# Patient Record
Sex: FEMALE | Race: WHITE | NOT HISPANIC OR LATINO | Employment: FULL TIME | URBAN - METROPOLITAN AREA
[De-identification: names, ages, dates, MRNs, and addresses within clinical notes are randomized per-mention and may not be internally consistent; named-entity substitution may affect disease eponyms.]

---

## 2017-05-01 ENCOUNTER — TRANSFERRED RECORDS (OUTPATIENT)
Dept: HEALTH INFORMATION MANAGEMENT | Facility: CLINIC | Age: 27
End: 2017-05-01

## 2017-05-01 LAB — PAP SMEAR - HIM PATIENT REPORTED: NEGATIVE

## 2018-05-31 ENCOUNTER — OFFICE VISIT (OUTPATIENT)
Dept: OBGYN | Facility: CLINIC | Age: 28
End: 2018-05-31
Payer: COMMERCIAL

## 2018-05-31 VITALS
SYSTOLIC BLOOD PRESSURE: 118 MMHG | HEIGHT: 64 IN | BODY MASS INDEX: 34.31 KG/M2 | DIASTOLIC BLOOD PRESSURE: 72 MMHG | WEIGHT: 201 LBS

## 2018-05-31 DIAGNOSIS — R22.32 LUMP ON FINGER, LEFT: ICD-10-CM

## 2018-05-31 DIAGNOSIS — Z30.41 ORAL CONTRACEPTIVE PILL SURVEILLANCE: ICD-10-CM

## 2018-05-31 DIAGNOSIS — Z01.419 ENCOUNTER FOR GYNECOLOGICAL EXAMINATION WITHOUT ABNORMAL FINDING: Primary | ICD-10-CM

## 2018-05-31 PROCEDURE — 99385 PREV VISIT NEW AGE 18-39: CPT | Performed by: OBSTETRICS & GYNECOLOGY

## 2018-05-31 RX ORDER — NORGESTIMATE AND ETHINYL ESTRADIOL 0.25-0.035
1 KIT ORAL DAILY
Qty: 84 TABLET | Refills: 3 | Status: SHIPPED | OUTPATIENT
Start: 2018-05-31 | End: 2019-04-22

## 2018-05-31 RX ORDER — NORGESTIMATE-ETHINYL ESTRADIOL 7DAYSX3 28
TABLET ORAL
Refills: 3 | COMMUNITY
Start: 2018-03-01 | End: 2019-04-22

## 2018-05-31 NOTE — MR AVS SNAPSHOT
After Visit Summary   5/31/2018    Svetlana Plasencia    MRN: [id number]           Patient Information     Date Of Birth          1990        Visit Information        Provider Department      5/31/2018 1:00 PM Donna Edwards MD [facility]        Today's Diagnoses     Encounter for gynecological examination without abnormal finding    -  1    Oral contraceptive pill surveillance        Lump on finger, left           Follow-ups after your visit        Additional Services     ORTHO  REFERRAL       [facility] is referring you to the Orthopedic  Services at [facility].       The  Representative will assist you in the coordination of your Orthopedic and Musculoskeletal Care as prescribed by your physician.    The  Representative will call you within 1 business day to help schedule your appointment, or you may contact the  Representative at:    All areas ~ [phone]     Type of Referral : Surgical / Specialist       Timeframe requested: Routine    Coverage of these services is subject to the terms and limitations of your health insurance plan.  Please call member services at your health plan with any benefit or coverage questions.      If X-rays, CT or MRI's have been performed, please contact the facility where they were done to arrange for , prior to your scheduled appointment.  Please bring this referral request to your appointment and present it to your specialist.                  Who to contact     If you have questions or need follow up information about today's clinic visit or your schedule please contact [facility] directly at [phone].  Normal or non-critical lab and imaging results will be communicated to you by MyChart, letter or phone within 4 business days after the clinic has received the results. If you do not hear from us within 7 days, please  "contact the clinic through [employer] or phone. If you have a critical or abnormal lab result, we will notify you by phone as soon as possible.  Submit refill requests through [employer] or call your pharmacy and they will forward the refill request to us. Please allow 3 business days for your refill to be completed.          Additional Information About Your Visit        [employer] App Information     [employer] gives you secure access to your electronic health record. If you see a primary care provider, you can also send messages to your care team and make appointments. If you have questions, please call your primary care clinic.  If you do not have a primary care provider, please call [phone] and they will assist you.        Care EveryWhere ID     This is your Care EveryWhere ID. This could be used by other organizations to access your [facility] medical records  [id number]        Your Vitals Were     Height Last Period BMI (Body Mass Index)             5' 3.5\" (1.613 m) 05/20/2018 (Exact Date) 35.05 kg/m2          Blood Pressure from Last 3 Encounters:   05/31/18 118/72    Weight from Last 3 Encounters:   05/31/18 201 lb (91.2 kg)              We Performed the Following     ORTHO  REFERRAL          Today's Medication Changes          These changes are accurate as of 5/31/18  1:46 PM.  If you have any questions, ask your nurse or doctor.               Start taking these medicines.        Dose/Directions    norgestimate-ethinyl estradiol 0.25-35 MG-MCG per tablet   Commonly known as:  ORTHO-CYCLEN, SPRINTEC   Used for:  Encounter for gynecological examination without abnormal finding, Oral contraceptive pill surveillance   Started by:  Donna Edwards MD        Dose:  1 tablet   Take 1 tablet by mouth daily   Quantity:  84 tablet   Refills:  3            Where to get your medicines      [id number] - [place], [place] - [address] [place] [address] NE AT [facility]" [address]  [address], [place] [address]     Phone:  [phone]     norgestimate-ethinyl estradiol 0.25-35 MG-MCG per tablet                Primary Care Provider    None Specified       No primary provider on file.        Equal Access to Services     PATRIZIA ORTEZ : Hadii migue russo hadalbinao Soomaali, waaxda luqadaha, qaybta kaalmada adeegyada, waxay kaylin haysonajacky adeatul matutetanyamelanie hankins. So [facility] [phone].    ATENCIÓN: Si habla español, tiene a akbar disposición servicios gratuitos de asistencia lingüística. Llame al [phone].    We comply with applicable federal civil rights laws and [place] laws. We do not discriminate on the basis of race, color, national origin, age, disability, sex, sexual orientation, or gender identity.            Thank you!     Thank you for choosing [facility]  for your care. Our goal is always to provide you with excellent care. Hearing back from our patients is one way we can continue to improve our services. Please take a few minutes to complete the written survey that you may receive in the mail after your visit with us. Thank you!             Your Updated Medication List - Protect others around you: Learn how to safely use, store and throw away your medicines at www.disposemymeds.org.          This list is accurate as of 5/31/18  1:46 PM.  Always use your most recent med list.                   Brand Name Dispense Instructions for use Diagnosis    norgestimate-ethinyl estradiol 0.25-35 MG-MCG per tablet    ORTHO-CYCLEN, SPRINTEC    84 tablet    Take 1 tablet by mouth daily    Encounter for gynecological examination without abnormal finding, Oral contraceptive pill surveillance       TRINESSA (28) 0.18/0.215/0.25 MG-35 MCG per tablet   Generic drug:  norgestim-eth estrad triphasic      TAKE 1 T PO QD

## 2018-05-31 NOTE — NURSING NOTE
"Chief Complaint   Patient presents with     Physical     disc BC, periods lasting longer on current pill. also has cyst on hand.        Initial /72  Ht 5' 3.5\" (1.613 m)  Wt 201 lb (91.2 kg)  LMP 2018 (Exact Date)  BMI 35.05 kg/m2 Estimated body mass index is 35.05 kg/(m^2) as calculated from the following:    Height as of this encounter: 5' 3.5\" (1.613 m).    Weight as of this encounter: 201 lb (91.2 kg).  BP completed using cuff size: large        The following HM Due: NONE      The following patient reported/Care Every where data was sent to:  P ABSTRACT QUALITY INITIATIVES [25025]  Pap smear done on this date: 2017 (approximately), by this group: [place], [place] were normal.       patient has appointment for today    Cora Mccoy CMA                "

## 2018-05-31 NOTE — PROGRESS NOTES
Svetlana is a 27 year old  female who presents for annual exam.     Menses are regular q 28-30 days and normal lasting 8 days.  Menses flow: normal.  Patient's last menstrual period was 2018 (exact date).. Using oral contraceptives for contraception.  She is not currently considering pregnancy.  Besides routine health maintenance,  she would like to discuss BC. Birth control hasn't changed but periods have gotten longer.   Lump on palmar surface of left index finger, bothering her and would like referral  GYNECOLOGIC HISTORY:  Menarche: 13    Svetlana is not sexually active with male partner(s) and is not currently in monogamous relationship.    History sexually transmitted infections:No STD history  STI testing offered?  Declined  OSCAR exposure: No  History of abnormal Pap smear: NO - age 21-29 PAP every 3 years recommended  Family history of breast CA: Yes (Please explain): m aunt  Family history of uterine/ovarian CA: No    Family history of colon CA: Yes (Please explain): mgfa    HEALTH MAINTENANCE:  Cholesterol: (No results found for: CHOL History of abnormal lipids: No  Mammo: no hx . History of abnormal Mammo: Not applicable.  Regular Self Breast Exams: No  Calcium/Vitamin D intake: source:  dietary supplement(s) Adequate? No  TSH: (No results found for: TSH )  Pap; (No results found for: PAP )    HISTORY:  Obstetric History       T0      L0     SAB0   TAB0   Ectopic0   Multiple0   Live Births0         No past medical history on file.  No past surgical history on file.  Family History   Problem Relation Age of Onset     Breast Cancer Maternal Aunt      Colon Cancer Paternal Grandfather      Ovarian Cancer No family hx of      Uterine Cancer No family hx of      Social History     Social History     Marital status: Single     Spouse name: N/A     Number of children: N/A     Years of education: N/A     Social History Main Topics     Smoking status: Never Smoker     Smokeless tobacco:  "Never Used     Alcohol use Yes      Comment: occ     Drug use: No     Sexual activity: Not Currently     Other Topics Concern     None     Social History Narrative     None       Current Outpatient Prescriptions:      TRINESSA, 28, 0.18/0.215/0.25 MG-35 MCG per tablet, TAKE 1 T PO QD, Disp: , Rfl: 3     Allergies   Allergen Reactions     Seasonal Allergies        Past medical, surgical, social and family history were reviewed and updated in EPIC.    ROS:   C:     NEGATIVE for fever, chills, change in weight  I:       NEGATIVE for worrisome rashes, moles or lesions  E:     NEGATIVE for vision changes or irritation  E/M: NEGATIVE for ear, mouth and throat problems  R:     NEGATIVE for significant cough or SOB  CV:   NEGATIVE for chest pain, palpitations or peripheral edema  GI:     NEGATIVE for nausea, abdominal pain, heartburn, or change in bowel habits  :   NEGATIVE for frequency, dysuria, hematuria, vaginal discharge, or irregular bleeding  M:     NEGATIVE for significant arthralgias or myalgia. Finger lump as above  N:      NEGATIVE for weakness, dizziness or paresthesias  E:      NEGATIVE for temperature intolerance, skin/hair changes  P:      NEGATIVE for changes in mood or affect.    EXAM:  Ht 5' 3.5\" (1.613 m)  Wt 201 lb (91.2 kg)  LMP 05/20/2018 (Exact Date)  BMI 35.05 kg/m2   BMI: Body mass index is 35.05 kg/(m^2).  Constitutional: healthy, alert and no distress  Head: Normocephalic. No masses, lesions, tenderness or abnormalities  Neck: Neck supple. Trachea midline. No adenopathy. Thyroid symmetric, normal size.   Cardiovascular: RRR.   Respiratory: Negative.   Breast: Breasts reveal mild symmetric fibrocystic densities, but there are no dominant, discrete, fixed or suspicious masses found.  Gastrointestinal: Abdomen soft, non-tender, non-distended. No masses, organomegaly.  :  Vulva:  No external lesions, normal female hair distribution, no inguinal adenopathy.    Urethra:  Midline, non-tender, well " supported, no discharge  Vagina:  Moist, pink, no abnormal discharge, no lesions  Uterus:  Normal size, non-tender, freely mobile  Ovaries:  No masses appreciated, non-tender, mobile  Rectal Exam: deferred  Musculoskeletal: extremities normal. 3 mm bony bump on palmar left index finger.  Skin: no suspicious lesions or rashes  Psychiatric: Affect appropriate, cooperative,mentation appears normal.     COUNSELING:   Reviewed preventive health counseling, as reflected in patient instructions       Regular exercise       Healthy diet/nutrition       Contraception   reports that she has never smoked. She has never used smokeless tobacco.    Body mass index is 35.05 kg/(m^2).  Weight management plan: Discussed healthy diet and exercise guidelines and patient will follow up in 12 months in clinic to re-evaluate.  FRAX Risk Assessment    ASSESSMENT:  27 year old female with satisfactory annual exam  (Z01.419) Encounter for gynecological examination without abnormal finding  (primary encounter diagnosis)  Comment:   Plan: norgestimate-ethinyl estradiol (ORTHO-CYCLEN,         SPRINTEC) 0.25-35 MG-MCG per tablet            (Z30.41) Oral contraceptive pill surveillance  Comment:   Plan: norgestimate-ethinyl estradiol (ORTHO-CYCLEN,         SPRINTEC) 0.25-35 MG-MCG per tablet        Try for 3 months. Will decrease estradiol if needed next.     (R22.32) Lump on finger, left  Comment:   Plan: ORTHO  REFERRAL

## 2018-06-08 ENCOUNTER — RADIANT APPOINTMENT (OUTPATIENT)
Dept: GENERAL RADIOLOGY | Facility: CLINIC | Age: 28
End: 2018-06-08
Attending: PHYSICIAN ASSISTANT
Payer: COMMERCIAL

## 2018-06-08 ENCOUNTER — OFFICE VISIT (OUTPATIENT)
Dept: ORTHOPEDICS | Facility: CLINIC | Age: 28
End: 2018-06-08
Payer: COMMERCIAL

## 2018-06-08 VITALS
BODY MASS INDEX: 34.31 KG/M2 | HEIGHT: 64 IN | DIASTOLIC BLOOD PRESSURE: 75 MMHG | SYSTOLIC BLOOD PRESSURE: 113 MMHG | RESPIRATION RATE: 16 BRPM | WEIGHT: 201 LBS

## 2018-06-08 DIAGNOSIS — M67.442 GANGLION OF FLEXOR TENDON SHEATH OF LEFT INDEX FINGER: Primary | ICD-10-CM

## 2018-06-08 DIAGNOSIS — M79.645 FINGER PAIN, LEFT: ICD-10-CM

## 2018-06-08 PROCEDURE — 99203 OFFICE O/P NEW LOW 30 MIN: CPT | Mod: 25 | Performed by: ORTHOPAEDIC SURGERY

## 2018-06-08 PROCEDURE — 20612 ASPIRATE/INJ GANGLION CYST: CPT | Mod: LT | Performed by: ORTHOPAEDIC SURGERY

## 2018-06-08 PROCEDURE — 73140 X-RAY EXAM OF FINGER(S): CPT | Mod: LT

## 2018-06-08 ASSESSMENT — PAIN SCALES - GENERAL: PAINLEVEL: MILD PAIN (2)

## 2018-06-08 NOTE — PROGRESS NOTES
Chief Complaint:   Chief Complaint   Patient presents with     Hand Pain     Left palmar sided index finger mass. Onset; 2 months. Does not limit movement but can become uncomfrotable with excessive use of fingers (typing) or with bumping against something. She is right hand dominant.         HPI: Svetlana Plasencia is a 27 year old female, right -hand dominant, who presents for evaluation and management of a left index finger mass. The mass as been noticed for 2 months. She has mild pain today, rated a 2/10.  Symptom are worsened with putting pressure on the mass, such as holding a water bottle/cup in the hand. Symptoms occur often. She reports having mild pain/discomfort around the mass site. She denies associated numbness or tingling. She denies any associated injuries or punctures to her finger in the area of the mass.  No other bumps, lumps, or other masses.        Changes in size: No   Changes in color: No   Tenderness of mass: Yes   Hot/cold sensitivity: No   Pain severity: 2/10  Pain quality: dull and aching  Frequency of symptoms: frequently.  Night pain: No   Fevers, chills, night sweats: No     Previous treatment: none    Past medical history:    There are no active problems to display for this patient.      Past surgical history:  has a past surgical history that includes no history of surgery.     Medications:    Current Outpatient Prescriptions   Medication Sig Dispense Refill     norgestimate-ethinyl estradiol (ORTHO-CYCLEN, SPRINTEC) 0.25-35 MG-MCG per tablet Take 1 tablet by mouth daily 84 tablet 3     TRINESSA, 28, 0.18/0.215/0.25 MG-35 MCG per tablet TAKE 1 T PO QD  3        Allergies:     Allergies   Allergen Reactions     Seasonal Allergies         Family History: family history includes Breast Cancer in her maternal aunt; Colon Cancer in her maternal grandfather. There is no history of Ovarian Cancer or Uterine Cancer.     Social History: .  reports that she has never smoked.  "She has never used smokeless tobacco. She reports that she drinks alcohol. She reports that she does not use illicit drugs.    Review of Systems:  ROS: 10 point ROS neg other than the symptoms noted above in the HPI and past medical history.    This document serves as a record of the services and decisions personally performed and made by Greg Shelley MD. It was created on his behalf by Cait Kessler, a trained medical scribe. The creation of this document is based the provider's statements to the medical scribe.    Scribe Cait Kessler 10:51 AM 6/8/2018       Physical Exam  GENERAL APPEARANCE: healthy, alert, no distress.   SKIN: no suspicious lesions or rashes  NEURO: Normal strength and tone, mentation intact and speech normal  PSYCH:  mentation appears normal and affect normal. Not anxious.  RESPIRATORY: No increased work of breathing.    /75  Resp 16  Ht 5' 3.5\" (1.613 m)  Wt 201 lb (91.2 kg)  LMP 05/20/2018 (Exact Date)  BMI 35.05 kg/m2     HAND EXAM:  Normal wear pattern, color and tone.  Palpable nodule, pea sized, located over ulnar aspect of volar metacarpophalangeal crease of the left index finger.  Mass does transiluminate with light.  Mass tender to palpation. Firm, fluctuant.  Negative Tinel's over mass.    No other observable or palpable masses of the fingers or palm or wrist.  No palpable triggering of fingers.   No observable or palpable cords or nodules of the fingers or palm.    There is no swelling in the finger.  There is no other tenderness in the finger.  There is no ecchymosis.  There is no erythema of the surrounding skin.  There is no maceration of the skin.  There is no deformity in the area.  Range of motion: intact  Intact sensation median, radial, ulnar nerves of the hand, and intact sensation to light touch.   Brisk capillary refill to all fingers, warm and well perfused.   Palpable radial pulse.  No palpable epitrochlear lymphadenopathy at the elbow.    X-rays:  3 views left " index finger from 6/8/2018 were reviewed in clinic today. No obvious fracture or dislocation. No obvious bony abnormality or lesion.    Assessment: 27 year old female with left index finger mass, ganglion cyst of flexor tendon sheath    Plan:  Discussed with patient that the mass is consistent with ganglion cyst, a common, benign tumor of the upper extremity. Less likely another type of tumor or neoplasm. Treatment options include: observation if asymptomatic or minimal symptoms, activity modification, splint, aspiration with cortisone injection (risks of recurrence > 50%), or surgical excision (risk of recurrence < 5-10%). Risks and benefits of each discussed in detail.    * in particular, risks of surgery include, but not limited to: bleeding, infection, pain, scar, damage to adjacent structures (nerves, vessels, bone, cartilage, tendons, ligaments), temporary versus permanent nerve injury, failure to relieve symptoms, recurrence of symptoms or recurrence of the mass, stiffness, need for further surgery, risks of anesthesia, blood clots, death    * will attempt a decompression in clinic today.  * return to clinic as needed.  * if this re-occurs, consider surgery noted above.     PROCEDURE NOTE:  The risks, benefits and potential complications (including but not limited to, bleeding, infection, pain, scar, damage to adjacent structures, atrophy or necrosis of soft tissue, skin blanching, failure to relieve symptoms) of aspiration were discussed with the patient. Questions were addressed and answered.The patient elected to proceed. Written, informed consent was obtained. The correct procedural site was identified and confirmed. A left index finger aspiration was performed using local anesthetic after sterile prep, to the correct procedural site. Mass was decompressed. Sterile bandaid applied. This was tolerated well by the patient. No apparent complications.         The information in this document, created by a  scribe for me, accurately reflects the services I personally performed and the decisions made by me. I have reviewed and approved this document for accuracy.        Greg Shelley M.D., M.S.  Dept. of Orthopaedic Surgery  [facility]

## 2018-06-08 NOTE — MR AVS SNAPSHOT
"              After Visit Summary   6/8/2018    Svetlana Plasencia    MRN: [id number]           Patient Information     Date Of Birth          1990        Visit Information        Provider Department      6/8/2018 10:30 AM Greg Shelley MD [facility]        Today's Diagnoses     Ganglion of flexor tendon sheath of left index finger    -  1    Finger pain, left           Follow-ups after your visit        Follow-up notes from your care team     Return if symptoms worsen or fail to improve.      Who to contact     If you have questions or need follow up information about today's clinic visit or your schedule please contact [facility] directly at [phone].  Normal or non-critical lab and imaging results will be communicated to you by MyChart, letter or phone within 4 business days after the clinic has received the results. If you do not hear from us within 7 days, please contact the clinic through [employer] or phone. If you have a critical or abnormal lab result, we will notify you by phone as soon as possible.  Submit refill requests through [employer] or call your pharmacy and they will forward the refill request to us. Please allow 3 business days for your refill to be completed.          Additional Information About Your Visit        MyChart Information     [employer] gives you secure access to your electronic health record. If you see a primary care provider, you can also send messages to your care team and make appointments. If you have questions, please call your primary care clinic.  If you do not have a primary care provider, please call [phone] and they will assist you.        Care EveryWhere ID     This is your Care EveryWhere ID. This could be used by other organizations to access your [place] medical records  [id number]        Your Vitals Were     Respirations Height Last Period BMI (Body Mass Index)          16 5' 3.5\" (1.613 m) 05/20/2018 (Exact Date) 35.05 kg/m2   "       Blood Pressure from Last 3 Encounters:   06/08/18 113/75   05/31/18 118/72    Weight from Last 3 Encounters:   06/08/18 201 lb (91.2 kg)   05/31/18 201 lb (91.2 kg)              We Performed the Following     ASPIRATION &/OR INJECTION GANGLION CYST, ANY        Primary Care Provider Fax #    Physician No Ref-Primary [phone]       No address on file        Equal Access to Services     PATRIZIA ORTEZ : Hadii aad ku hadasho Soomaali, waaxda luqadaha, qaybta kaalmada adeegyada, waxay idiin haysonan adeatul sarmiento laJakenaldo . So [facility] [phone].    ATENCIÓN: Si habla espmolina, tiene a akbar disposición servicios gratuitos de asistencia lingüística. Llame al [phone].    We comply with applicable federal civil rights laws and [place] laws. We do not discriminate on the basis of race, color, national origin, age, disability, sex, sexual orientation, or gender identity.            Thank you!     Thank you for choosing [facility]  for your care. Our goal is always to provide you with excellent care. Hearing back from our patients is one way we can continue to improve our services. Please take a few minutes to complete the written survey that you may receive in the mail after your visit with us. Thank you!             Your Updated Medication List - Protect others around you: Learn how to safely use, store and throw away your medicines at www.disposemymeds.org.          This list is accurate as of 6/8/18 11:59 PM.  Always use your most recent med list.                   Brand Name Dispense Instructions for use Diagnosis    norgestimate-ethinyl estradiol 0.25-35 MG-MCG per tablet    ORTHO-CYCLEN, SPRINTEC    84 tablet    Take 1 tablet by mouth daily    Encounter for gynecological examination without abnormal finding, Oral contraceptive pill surveillance       TRINESSA (28) 0.18/0.215/0.25 MG-35 MCG per tablet   Generic drug:  norgestim-eth estrad triphasic      TAKE 1 T PO QD

## 2018-06-08 NOTE — PROGRESS NOTES
The patient's left index finger was prepped with betadine solution after verification of allergies. Area approximately 10 cm x 10 cm prepped in a sterile fashion. After injection, betadine removed with soap and water and band-aids applied.    0.5cc Lidocaine 1% NDC 8497-1660-07, LOT -dk,  2019   injected into patient's superficial skin over cyst. Tendon sheath cyst was decompressed with an 18g needle by:  Alfredo Hammond PA-C, PABLO (Ortho)  Supervising Physician: Greg Shelley M.D., M.S.  Dept. of Orthopaedic Surgery  [facility]

## 2018-06-08 NOTE — LETTER
6/8/2018         RE: Svetlana Plasencia  [address]  [place] [address]        Dear Colleague,    Thank you for referring your patient, Svetlana Plasencia, to the [facility]. Please see a copy of my visit note below.    Chief Complaint:   Chief Complaint   Patient presents with     Hand Pain     Left palmar sided index finger mass. Onset; 2 months. Does not limit movement but can become uncomfrotable with excessive use of fingers (typing) or with bumping against something. She is right hand dominant.         HPI: Svetlana Plasencia is a 27 year old female, right -hand dominant, who presents for evaluation and management of a left index finger mass. The mass as been noticed for 2 months. She has mild pain today, rated a 2/10.  Symptom are worsened with putting pressure on the mass, such as holding a water bottle/cup in the hand. Symptoms occur often. She reports having mild pain/discomfort around the mass site. She denies associated numbness or tingling. She denies any associated injuries or punctures to her finger in the area of the mass.  No other bumps, lumps, or other masses.        Changes in size: No   Changes in color: No   Tenderness of mass: Yes   Hot/cold sensitivity: No   Pain severity: 2/10  Pain quality: dull and aching  Frequency of symptoms: frequently.  Night pain: No   Fevers, chills, night sweats: No     Previous treatment: none    Past medical history:    There are no active problems to display for this patient.      Past surgical history:  has a past surgical history that includes no history of surgery.     Medications:    Current Outpatient Prescriptions   Medication Sig Dispense Refill     norgestimate-ethinyl estradiol (ORTHO-CYCLEN, SPRINTEC) 0.25-35 MG-MCG per tablet Take 1 tablet by mouth daily 84 tablet 3     TRINESSA, 28, 0.18/0.215/0.25 MG-35 MCG per tablet TAKE 1 T PO QD  3        Allergies:     Allergies   Allergen Reactions     Seasonal Allergies      "    Family History: family history includes Breast Cancer in her maternal aunt; Colon Cancer in her maternal grandfather. There is no history of Ovarian Cancer or Uterine Cancer.     Social History: .  reports that she has never smoked. She has never used smokeless tobacco. She reports that she drinks alcohol. She reports that she does not use illicit drugs.    Review of Systems:  ROS: 10 point ROS neg other than the symptoms noted above in the HPI and past medical history.    This document serves as a record of the services and decisions personally performed and made by Greg Shelley MD. It was created on his behalf by Cait Kessler, a trained medical scribe. The creation of this document is based the provider's statements to the medical scribe.    Scribe Cait Kessler 10:51 AM 6/8/2018       Physical Exam  GENERAL APPEARANCE: healthy, alert, no distress.   SKIN: no suspicious lesions or rashes  NEURO: Normal strength and tone, mentation intact and speech normal  PSYCH:  mentation appears normal and affect normal. Not anxious.  RESPIRATORY: No increased work of breathing.    /75  Resp 16  Ht 5' 3.5\" (1.613 m)  Wt 201 lb (91.2 kg)  LMP 05/20/2018 (Exact Date)  BMI 35.05 kg/m2     HAND EXAM:  Normal wear pattern, color and tone.  Palpable nodule, pea sized, located over ulnar aspect of volar metacarpophalangeal crease of the left index finger.  Mass does transiluminate with light.  Mass tender to palpation. Firm, fluctuant.  Negative Tinel's over mass.    No other observable or palpable masses of the fingers or palm or wrist.  No palpable triggering of fingers.   No observable or palpable cords or nodules of the fingers or palm.    There is no swelling in the finger.  There is no other tenderness in the finger.  There is no ecchymosis.  There is no erythema of the surrounding skin.  There is no maceration of the skin.  There is no deformity in the area.  Range of motion: intact  Intact " sensation median, radial, ulnar nerves of the hand, and intact sensation to light touch.   Brisk capillary refill to all fingers, warm and well perfused.   Palpable radial pulse.  No palpable epitrochlear lymphadenopathy at the elbow.    X-rays:  3 views left index finger from 6/8/2018 were reviewed in clinic today. No obvious fracture or dislocation. No obvious bony abnormality or lesion.    Assessment: 27 year old female with left index finger mass, ganglion cyst of flexor tendon sheath    Plan:  Discussed with patient that the mass is consistent with ganglion cyst, a common, benign tumor of the upper extremity. Less likely another type of tumor or neoplasm. Treatment options include: observation if asymptomatic or minimal symptoms, activity modification, splint, aspiration with cortisone injection (risks of recurrence > 50%), or surgical excision (risk of recurrence < 5-10%). Risks and benefits of each discussed in detail.    * in particular, risks of surgery include, but not limited to: bleeding, infection, pain, scar, damage to adjacent structures (nerves, vessels, bone, cartilage, tendons, ligaments), temporary versus permanent nerve injury, failure to relieve symptoms, recurrence of symptoms or recurrence of the mass, stiffness, need for further surgery, risks of anesthesia, blood clots, death    * will attempt a decompression in clinic today.  * return to clinic as needed.  * if this re-occurs, consider surgery noted above.     PROCEDURE NOTE:  The risks, benefits and potential complications (including but not limited to, bleeding, infection, pain, scar, damage to adjacent structures, atrophy or necrosis of soft tissue, skin blanching, failure to relieve symptoms) of aspiration were discussed with the patient. Questions were addressed and answered.The patient elected to proceed. Written, informed consent was obtained. The correct procedural site was identified and confirmed. A left index finger aspiration  was performed using local anesthetic after sterile prep, to the correct procedural site. Mass was decompressed. Sterile bandaid applied. This was tolerated well by the patient. No apparent complications.         The information in this document, created by a scribe for me, accurately reflects the services I personally performed and the decisions made by me. I have reviewed and approved this document for accuracy.        Greg Shelley M.D., M.S.  Dept. of Orthopaedic Surgery  [facility]      The patient's left index finger was prepped with betadine solution after verification of allergies. Area approximately 10 cm x 10 cm prepped in a sterile fashion. After injection, betadine removed with soap and water and band-aids applied.    0.5cc Lidocaine 1% NDC 6746-2792-85, LOT -dk,  2019   injected into patient's superficial skin over cyst. Tendon sheath cyst was decompressed with an 18g needle by:  Alfredo Hammond PA-C, CAQ (Ortho)  Supervising Physician: Greg Shelley M.D., M.S.  Dept. of Orthopaedic Surgery  [facility]          Again, thank you for allowing me to participate in the care of your patient.        Sincerely,        Greg Shelley MD

## 2019-04-22 ENCOUNTER — MYC MEDICAL ADVICE (OUTPATIENT)
Dept: OBGYN | Facility: CLINIC | Age: 29
End: 2019-04-22

## 2019-04-22 DIAGNOSIS — Z30.41 ORAL CONTRACEPTIVE PILL SURVEILLANCE: ICD-10-CM

## 2019-04-22 DIAGNOSIS — Z01.419 ENCOUNTER FOR GYNECOLOGICAL EXAMINATION WITHOUT ABNORMAL FINDING: ICD-10-CM

## 2019-04-22 NOTE — TELEPHONE ENCOUNTER
Pharmacy sent refill request for birth control pills.  [employer] message sent to pt to remind her to schedule.  3 month refill sent.  Sussy Knowles RN

## 2019-05-07 RX ORDER — NORGESTIMATE AND ETHINYL ESTRADIOL 0.25-0.035
1 KIT ORAL DAILY
Qty: 84 TABLET | Refills: 0 | Status: SHIPPED | OUTPATIENT
Start: 2019-05-07 | End: 2019-06-06

## 2019-05-07 NOTE — TELEPHONE ENCOUNTER
Pending Prescriptions:                       Disp   Refills    norgestimate-ethinyl estradiol (ORTHO-CYC*84 tab*0            Sig: Take 1 tablet by mouth daily    Appt made. Rx sent.  Ramona Armenta

## 2019-06-06 ENCOUNTER — OFFICE VISIT (OUTPATIENT)
Dept: OBGYN | Facility: CLINIC | Age: 29
End: 2019-06-06
Payer: COMMERCIAL

## 2019-06-06 VITALS
HEIGHT: 64 IN | OXYGEN SATURATION: 100 % | HEART RATE: 75 BPM | SYSTOLIC BLOOD PRESSURE: 119 MMHG | DIASTOLIC BLOOD PRESSURE: 87 MMHG | TEMPERATURE: 97.7 F | BODY MASS INDEX: 32.61 KG/M2 | WEIGHT: 191 LBS

## 2019-06-06 DIAGNOSIS — Z30.41 ORAL CONTRACEPTIVE PILL SURVEILLANCE: ICD-10-CM

## 2019-06-06 DIAGNOSIS — Z01.419 ENCOUNTER FOR GYNECOLOGICAL EXAMINATION WITHOUT ABNORMAL FINDING: ICD-10-CM

## 2019-06-06 PROCEDURE — 99395 PREV VISIT EST AGE 18-39: CPT | Performed by: OBSTETRICS & GYNECOLOGY

## 2019-06-06 RX ORDER — NORGESTIMATE AND ETHINYL ESTRADIOL 0.25-0.035
1 KIT ORAL DAILY
Qty: 84 TABLET | Refills: 3 | Status: SHIPPED | OUTPATIENT
Start: 2019-06-06 | End: 2020-05-05

## 2019-06-06 RX ORDER — NORGESTIMATE AND ETHINYL ESTRADIOL 0.25-0.035
1 KIT ORAL DAILY
Qty: 84 TABLET | Refills: 3 | Status: SHIPPED | OUTPATIENT
Start: 2019-06-06 | End: 2019-06-06

## 2019-06-06 ASSESSMENT — ANXIETY QUESTIONNAIRES
5. BEING SO RESTLESS THAT IT IS HARD TO SIT STILL: NOT AT ALL
1. FEELING NERVOUS, ANXIOUS, OR ON EDGE: SEVERAL DAYS
7. FEELING AFRAID AS IF SOMETHING AWFUL MIGHT HAPPEN: NOT AT ALL
GAD7 TOTAL SCORE: 5
IF YOU CHECKED OFF ANY PROBLEMS ON THIS QUESTIONNAIRE, HOW DIFFICULT HAVE THESE PROBLEMS MADE IT FOR YOU TO DO YOUR WORK, TAKE CARE OF THINGS AT HOME, OR GET ALONG WITH OTHER PEOPLE: SOMEWHAT DIFFICULT
2. NOT BEING ABLE TO STOP OR CONTROL WORRYING: SEVERAL DAYS
6. BECOMING EASILY ANNOYED OR IRRITABLE: SEVERAL DAYS
3. WORRYING TOO MUCH ABOUT DIFFERENT THINGS: SEVERAL DAYS

## 2019-06-06 ASSESSMENT — MIFFLIN-ST. JEOR: SCORE: 1573.43

## 2019-06-06 ASSESSMENT — PATIENT HEALTH QUESTIONNAIRE - PHQ9
SUM OF ALL RESPONSES TO PHQ QUESTIONS 1-9: 4
5. POOR APPETITE OR OVEREATING: SEVERAL DAYS

## 2019-06-06 NOTE — NURSING NOTE
"Chief Complaint   Patient presents with     Contraception     Switching bc rx.        Initial /87   Pulse 75   Temp 97.7  F (36.5  C) (Oral)   Ht 1.613 m (5' 3.5\")   Wt 86.6 kg (191 lb)   LMP 2019 (Approximate)   SpO2 100%   Breastfeeding? No   BMI 33.30 kg/m   Estimated body mass index is 33.3 kg/m  as calculated from the following:    Height as of this encounter: 1.613 m (5' 3.5\").    Weight as of this encounter: 86.6 kg (191 lb).  BP completed using cuff size: large    Questioned patient about current smoking habits.  Pt. has never smoked.          The following HM Due: NONE  Vaccinations: tdap      The following patient reported/Care Every where data was sent to:  P ABSTRACT QUALITY INITIATIVES [[id number]]  n/a      patient has appointment for today              "

## 2019-06-06 NOTE — PROGRESS NOTES
Svetlana is a 28 year old  female who presents for annual exam.     Menses are regular q 28-30 days and normal lasting 5 days.  Menses flow: normal.  Patient's last menstrual period was 2019. Using oral contraceptives for contraception.  She is not currently considering pregnancy.  Besides routine health maintenance, she has no other health concerns today . Periods were heavier, but now normalizing.   GYNECOLOGIC HISTORY:  Menarche: 13     Svetlana is not sexually active with male partner(s) and is not currently in monogamous relationship.    History sexually transmitted infections:No STD history  STI testing offered?  Declined  OSCAR exposure: No  History of abnormal Pap smear: NO - age 21-29 PAP every 3 years recommended  Family history of breast CA: Yes (Please explain): m aunt  Family history of uterine/ovarian CA: No     Family history of colon CA: Yes (Please explain): mgfa     HEALTH MAINTENANCE:  Cholesterol: (No results found for: CHOL History of abnormal lipids: No  Mammo: no hx . History of abnormal Mammo: Not applicable.  Regular Self Breast Exams: No  Calcium/Vitamin D intake: source:  dietary supplement(s) Adequate? No  TSH: (No results found for: TSH )  Pap; (No results found for: PAP )     HISTORY:  OB History    Para Term  AB Living   0 0 0 0 0 0   SAB TAB Ectopic Multiple Live Births   0 0 0 0 0     Past Medical History:   Diagnosis Date     NO ACTIVE PROBLEMS      Past Surgical History:   Procedure Laterality Date     NO HISTORY OF SURGERY       Family History   Problem Relation Age of Onset     Breast Cancer Maternal Aunt      Colon Cancer Maternal Grandfather      Ovarian Cancer No family hx of      Uterine Cancer No family hx of      Social History     Socioeconomic History     Marital status: Single     Spouse name: Not on file     Number of children: Not on file     Years of education: Not on file     Highest education level: Not on file   Occupational History     Not  on file   Social Needs     Financial resource strain: Not on file     Food insecurity:     Worry: Not on file     Inability: Not on file     Transportation needs:     Medical: Not on file     Non-medical: Not on file   Tobacco Use     Smoking status: Never Smoker     Smokeless tobacco: Never Used   Substance and Sexual Activity     Alcohol use: Yes     Comment: occ     Drug use: No     Sexual activity: Not Currently   Lifestyle     Physical activity:     Days per week: Not on file     Minutes per session: Not on file     Stress: Not on file   Relationships     Social connections:     Talks on phone: Not on file     Gets together: Not on file     Attends Congregational service: Not on file     Active member of club or organization: Not on file     Attends meetings of clubs or organizations: Not on file     Relationship status: Not on file     Intimate partner violence:     Fear of current or ex partner: Not on file     Emotionally abused: Not on file     Physically abused: Not on file     Forced sexual activity: Not on file   Other Topics Concern     Not on file   Social History Narrative     Not on file       Current Outpatient Medications:      norgestimate-ethinyl estradiol (ORTHO-CYCLEN/SPRINTEC) 0.25-35 MG-MCG tablet, Take 1 tablet by mouth daily, Disp: 84 tablet, Rfl: 3     Allergies   Allergen Reactions     Seasonal Allergies        Past medical, surgical, social and family history were reviewed and updated in EPIC.    ROS:   C:     NEGATIVE for fever, chills, change in weight  I:       NEGATIVE for worrisome rashes, moles or lesions  E:     NEGATIVE for vision changes or irritation  E/M: NEGATIVE for ear, mouth and throat problems  R:     NEGATIVE for significant cough or SOB  CV:   NEGATIVE for chest pain, palpitations or peripheral edema  GI:     NEGATIVE for nausea, abdominal pain, heartburn, or change in bowel habits  :   NEGATIVE for frequency, dysuria, hematuria, vaginal discharge, or irregular  "bleeding  M:     NEGATIVE for significant arthralgias or myalgia  N:      NEGATIVE for weakness, dizziness or paresthesias  E:      NEGATIVE for temperature intolerance, skin/hair changes  P:      NEGATIVE for changes in mood or affect.    EXAM:  /87   Pulse 75   Temp 97.7  F (36.5  C) (Oral)   Ht 1.613 m (5' 3.5\")   Wt 86.6 kg (191 lb)   LMP 05/22/2019 (Approximate)   SpO2 100%   Breastfeeding? No   BMI 33.30 kg/m     BMI: Body mass index is 33.3 kg/m .  Constitutional: healthy, alert and no distress  Head: Normocephalic. No masses, lesions, tenderness or abnormalities  Neck: Neck supple. Trachea midline. No adenopathy. Thyroid symmetric, normal size.   Cardiovascular: RRR.   Respiratory: Negative.   Breast: Breasts reveal mild symmetric fibrocystic densities, but there are no dominant, discrete, fixed or suspicious masses found.  Gastrointestinal: Abdomen soft, non-tender, non-distended. No masses, organomegaly.  :  Vulva:  No external lesions, normal female hair distribution, no inguinal adenopathy.    Urethra:  Midline, non-tender, well supported, no discharge  Vagina:  Moist, pink, no abnormal discharge, no lesions  Uterus:  Normal size, anteverted , non-tender, freely mobile  Ovaries:  No masses appreciated, non-tender, mobile  Rectal Exam: deferred  Musculoskeletal: extremities normal  Skin: no suspicious lesions or rashes  Psychiatric: Affect appropriate, cooperative,mentation appears normal.     COUNSELING:   Reviewed preventive health counseling, as reflected in patient instructions       Regular exercise       Healthy diet/nutrition       Sleep hygiene   reports that she has never smoked. She has never used smokeless tobacco.    Body mass index is 33.3 kg/m .  Weight management plan: Discussed healthy diet and exercise guidelines. Down 10 pounds from last year.  FRAX Risk Assessment    ASSESSMENT:  28 year old female with satisfactory annual exam  (Z01.419) Encounter for gynecological " examination without abnormal finding  Comment:   Plan: norgestimate-ethinyl estradiol         (ORTHO-CYCLEN/SPRINTEC) 0.25-35 MG-MCG tablet,         DISCONTINUED: norgestimate-ethinyl estradiol         (ORTHO-CYCLEN/SPRINTEC) 0.25-35 MG-MCG tablet   May want to switch pills, just send [employer] message if so.    (Z30.41) Oral contraceptive pill surveillance  Comment:   Plan: norgestimate-ethinyl estradiol         (ORTHO-CYCLEN/SPRINTEC) 0.25-35 MG-MCG tablet,         DISCONTINUED: norgestimate-ethinyl estradiol         (ORTHO-CYCLEN/SPRINTEC) 0.25-35 MG-MCG tablet

## 2019-06-07 ASSESSMENT — ANXIETY QUESTIONNAIRES: GAD7 TOTAL SCORE: 5

## 2019-07-31 ENCOUNTER — TELEPHONE (OUTPATIENT)
Dept: FAMILY MEDICINE | Facility: CLINIC | Age: 29
End: 2019-07-31

## 2019-07-31 NOTE — PROGRESS NOTES
Svetlana Plasencia is a 28 year old female who presents to clinic today for the following health issues:    Dog Bite - Finger       Duration: Tuesday evening     Description (location/character/radiation): A puppy that she has and was holding a toy out for her and puppy bit her pointy finger by accident. Puppy has an infection and vets said she might have septic arthritis like a bacterial infection of joints but they are not sure. Puppy is on anti-biotics. Pointy finger has no constant pain but if touch it will be a little sore and tender. Swelling seems to have gone down.      Intensity:  mild    Accompanying signs and symptoms: tenderness, soreness when touching it/     History (similar episodes/previous evaluation): None    Precipitating or alleviating factors: None    Therapies tried and outcome: antio-biotic cream and bandage switching,      -Patient would like to make sure she does not have an infection related to the dog bite, dog is currently on anti-biotics  -Finger has been throbbing, she notes the swelling has gone down and the wound seems to be improving, pain has improved  -She has used an unspecified antibiotic ointment       Problem list and histories reviewed & adjusted, as indicated.  Additional history: as documented    ROS:  CONSTITUTIONAL: NEGATIVE for fever, chills, change in weight  INTEGUMENTARY/SKIN: NEGATIVE for worrisome rashes, moles or lesions  EYES: NEGATIVE for vision changes or irritation  ENT/MOUTH: NEGATIVE for ear, mouth and throat problems  RESP: NEGATIVE for significant cough or SOB  BREAST: NEGATIVE for masses, tenderness or discharge  CV: NEGATIVE for chest pain, palpitations or peripheral edema  GI: NEGATIVE for nausea, abdominal pain, heartburn, or change in bowel habits  : NEGATIVE for frequency, dysuria, or hematuria  MUSCULOSKELETAL: NEGATIVE for significant arthralgias or myalgia, see HPI above  NEURO: NEGATIVE for weakness, dizziness or paresthesias  ENDOCRINE: NEGATIVE  "for temperature intolerance, skin/hair changes  HEME: NEGATIVE for bleeding problems  PSYCHIATRIC: NEGATIVE for changes in mood or affect    This document serves as a record of the services and decisions personally performed and made by Keiko Abraham PA-C. It was created on her behalf by Charlie Victor, trained medical scribe. The creation of this document is based on the provider's statements to the medical scribe.  Charlie Victor 7:30 AM August 1, 2019    There is no problem list on file for this patient.    Past Surgical History:   Procedure Laterality Date     NO HISTORY OF SURGERY         Social History     Tobacco Use     Smoking status: Never Smoker     Smokeless tobacco: Never Used   Substance Use Topics     Alcohol use: Yes     Comment: occ.     Family History   Problem Relation Age of Onset     Breast Cancer Maternal Aunt      Colon Cancer Maternal Grandfather      Ovarian Cancer No family hx of      Uterine Cancer No family hx of            Labs reviewed in EPIC  There is no problem list on file for this patient.    Past Surgical History:   Procedure Laterality Date     NO HISTORY OF SURGERY         Social History     Tobacco Use     Smoking status: Never Smoker     Smokeless tobacco: Never Used   Substance Use Topics     Alcohol use: Yes     Comment: occ.     Family History   Problem Relation Age of Onset     Breast Cancer Maternal Aunt      Colon Cancer Maternal Grandfather      Ovarian Cancer No family hx of      Uterine Cancer No family hx of          Current Outpatient Medications   Medication Sig Dispense Refill     cephALEXin (KEFLEX) 500 MG capsule Take 1 capsule (500 mg) by mouth 4 times daily for 7 days 28 capsule 0     norgestimate-ethinyl estradiol (ORTHO-CYCLEN/SPRINTEC) 0.25-35 MG-MCG tablet Take 1 tablet by mouth daily 84 tablet 3       OBJECTIVE:                                                    /78   Pulse 80   Temp 99.2  F (37.3  C) (Oral)   Ht 1.6 m (5' 3\")   Wt 85.2 kg " (187 lb 12.8 oz)   LMP 07/15/2019 (Approximate)   SpO2 97%   BMI 33.27 kg/m   Body mass index is 33.27 kg/m .   GENERAL:: healthy, alert and no distress  MS: extremities- no gross deformities noted, right second digit's distal tip is red and swollen, 3 mm ulceration, mildly decreased flexion at the DIP joint. Negative for lymphangitis.   SKIN: no suspicious lesions, no rashes  NEURO: strength and tone- normal, sensory exam- grossly normal, mentation- intact, speech- normal  PSYCH: Alert and oriented times 3; speech- coherent , normal rate and volume; able to articulate logical thoughts, able to abstract reason, no tangential thoughts, no hallucinations or delusions, affect- normal     No results found for this or any previous visit (from the past 24 hour(s)).       ASSESSMENT/PLAN:                                                        ICD-10-CM    1. Cellulitis of finger of right hand L03.011 cephALEXin (KEFLEX) 500 MG capsule     Continue with topical Neosporin. If any worsening over the weekend, start oral antibiotics. Return to clinic for any new or worsening symptoms or go to ER Urgent care in off hours       Patient Instructions     Patient Education     Cellulitis  Cellulitis is an infection of the deep layers of skin. A break in the skin, such as a cut or scratch, can let bacteria under the skin. If the bacteria get to deep layers of the skin, it can be serious. If not treated, cellulitis can get into the bloodstream and lymph nodes. The infection can then spread throughout the body. This causes serious illness.  Cellulitis causes the affected skin to become red, swollen, warm, and sore. The reddened areas have a visible border. An open sore may leak fluid (pus). You may have a fever, chills, and pain.  Cellulitis is treated with antibiotics taken for 7 to 10 days. An open sore may be cleaned and covered with cool wet gauze. Symptoms should get better 1 to 2 days after treatment is started. Make sure to  "take all the antibiotics for the full number of days until they are gone. Keep taking the medicine even if your symptoms go away.  Home care  Follow these tips:    Limit the use of the part of your body with cellulitis.     If the infection is on your leg, keep your leg raised while sitting. This will help to reduce swelling.    Take all of the antibiotic medicine exactly as directed until it is gone. Do not miss any doses, especially during the first 7 days. Don t stop taking the medicine when your symptoms get better.    Keep the affected area clean and dry.    Wash your hands with soap and warm water before and after touching your skin. Anyone else who touches your skin should also wash his or her hands. Don't share towels.  Follow-up care  Follow up with your healthcare provider, or as advised. If your infection does not go away on the first antibiotic, your healthcare provider will prescribe a different one.  When to seek medical advice  Call your healthcare provider right away if any of these occur:    Red areas that spread    Swelling or pain that gets worse    Fluid leaking from the skin (pus)    Fever higher of 100.4  F (38.0  C) or higher after 2 days on antibiotics  Date Last Reviewed: 9/1/2016 2000-2018 [employer]. [address] [place] [address]. All rights reserved. This information is not intended as a substitute for professional medical care. Always follow your healthcare professional's instructions.               Estimated body mass index is 33.27 kg/m  as calculated from the following:    Height as of this encounter: 1.6 m (5' 3\").    Weight as of this encounter: 85.2 kg (187 lb 12.8 oz).     The information in this document, created by the medical scribe for me, accurately reflects the services I personally performed and the decisions made by me. I have reviewed and approved this document for accuracy prior to leaving the patient care area.  August 1, 2019 7:30 " AM    Keiko Abraham  [facility] [place]

## 2019-07-31 NOTE — TELEPHONE ENCOUNTER
Received call from patient stated that she has a dog bite. Location of bite-- right behind nail bed, on hand, pointer finger. It is swollen, red, and painful.    --Vet is not sure what the dog has, but her elbow joint was super swollen so they diagnosed her with bacterial infection.     Depth of bite-- she can't tell. It also ripped some skin off. Dog is a puppy, so not super that it is too deep. She put antibiotic on it.     Writer advised to keep area clean, keep antibiotic ointment on it, and keep it covered, and then if not improving, come into clinic to be seen. Pt scheduled OV for tomorrow with provider to get finger checked if not improving.    Sarai Awan RN  [facility]

## 2019-08-01 ENCOUNTER — OFFICE VISIT (OUTPATIENT)
Dept: FAMILY MEDICINE | Facility: CLINIC | Age: 29
End: 2019-08-01
Payer: COMMERCIAL

## 2019-08-01 VITALS
BODY MASS INDEX: 33.27 KG/M2 | TEMPERATURE: 99.2 F | DIASTOLIC BLOOD PRESSURE: 78 MMHG | WEIGHT: 187.8 LBS | HEART RATE: 80 BPM | SYSTOLIC BLOOD PRESSURE: 112 MMHG | OXYGEN SATURATION: 97 % | HEIGHT: 63 IN

## 2019-08-01 DIAGNOSIS — L03.011 CELLULITIS OF FINGER OF RIGHT HAND: Primary | ICD-10-CM

## 2019-08-01 PROCEDURE — 99203 OFFICE O/P NEW LOW 30 MIN: CPT | Performed by: PHYSICIAN ASSISTANT

## 2019-08-01 RX ORDER — CEPHALEXIN 500 MG/1
500 CAPSULE ORAL 4 TIMES DAILY
Qty: 28 CAPSULE | Refills: 0 | Status: SHIPPED | OUTPATIENT
Start: 2019-08-01 | End: 2019-08-08

## 2019-08-01 ASSESSMENT — MIFFLIN-ST. JEOR: SCORE: 1550.99

## 2019-08-01 NOTE — PATIENT INSTRUCTIONS
Patient Education     Cellulitis  Cellulitis is an infection of the deep layers of skin. A break in the skin, such as a cut or scratch, can let bacteria under the skin. If the bacteria get to deep layers of the skin, it can be serious. If not treated, cellulitis can get into the bloodstream and lymph nodes. The infection can then spread throughout the body. This causes serious illness.  Cellulitis causes the affected skin to become red, swollen, warm, and sore. The reddened areas have a visible border. An open sore may leak fluid (pus). You may have a fever, chills, and pain.  Cellulitis is treated with antibiotics taken for 7 to 10 days. An open sore may be cleaned and covered with cool wet gauze. Symptoms should get better 1 to 2 days after treatment is started. Make sure to take all the antibiotics for the full number of days until they are gone. Keep taking the medicine even if your symptoms go away.  Home care  Follow these tips:    Limit the use of the part of your body with cellulitis.     If the infection is on your leg, keep your leg raised while sitting. This will help to reduce swelling.    Take all of the antibiotic medicine exactly as directed until it is gone. Do not miss any doses, especially during the first 7 days. Don t stop taking the medicine when your symptoms get better.    Keep the affected area clean and dry.    Wash your hands with soap and warm water before and after touching your skin. Anyone else who touches your skin should also wash his or her hands. Don't share towels.  Follow-up care  Follow up with your healthcare provider, or as advised. If your infection does not go away on the first antibiotic, your healthcare provider will prescribe a different one.  When to seek medical advice  Call your healthcare provider right away if any of these occur:    Red areas that spread    Swelling or pain that gets worse    Fluid leaking from the skin (pus)    Fever higher of 100.4  F (38.0  C) or  higher after 2 days on antibiotics  Date Last Reviewed: 9/1/2016 2000-2018 [employer]. [address] [place] [address], [place] [address]. All rights reserved. This information is not intended as a substitute for professional medical care. Always follow your healthcare professional's instructions.

## 2020-03-11 ENCOUNTER — HEALTH MAINTENANCE LETTER (OUTPATIENT)
Age: 30
End: 2020-03-11

## 2020-05-05 ENCOUNTER — MYC REFILL (OUTPATIENT)
Dept: OBGYN | Facility: CLINIC | Age: 30
End: 2020-05-05

## 2020-05-05 DIAGNOSIS — Z30.41 ORAL CONTRACEPTIVE PILL SURVEILLANCE: ICD-10-CM

## 2020-05-05 DIAGNOSIS — Z01.419 ENCOUNTER FOR GYNECOLOGICAL EXAMINATION WITHOUT ABNORMAL FINDING: ICD-10-CM

## 2020-05-05 RX ORDER — NORGESTIMATE AND ETHINYL ESTRADIOL 0.25-0.035
1 KIT ORAL DAILY
Qty: 84 TABLET | Refills: 0 | Status: SHIPPED | OUTPATIENT
Start: 2020-05-05 | End: 2020-07-21

## 2020-05-05 NOTE — TELEPHONE ENCOUNTER
Refill request sent for OCP's. Patient's last annual exam 06/2020. Due to COVID-19 and need to back preventative appointments, refill sent. Instruction attached to have patient call in July to schedule an appointment. Doris Oconnell RN

## 2020-07-20 DIAGNOSIS — Z01.419 ENCOUNTER FOR GYNECOLOGICAL EXAMINATION WITHOUT ABNORMAL FINDING: ICD-10-CM

## 2020-07-20 DIAGNOSIS — Z30.41 ORAL CONTRACEPTIVE PILL SURVEILLANCE: ICD-10-CM

## 2020-07-21 RX ORDER — NORGESTIMATE AND ETHINYL ESTRADIOL 0.25-0.035
1 KIT ORAL DAILY
Qty: 84 TABLET | Refills: 3 | Status: SHIPPED | OUTPATIENT
Start: 2020-07-21 | End: 2021-02-16

## 2020-07-21 NOTE — TELEPHONE ENCOUNTER
Routing refill request to provider for review/approval because:  Kaci given x1 and patient did not follow up, please advise

## 2021-01-03 ENCOUNTER — HEALTH MAINTENANCE LETTER (OUTPATIENT)
Age: 31
End: 2021-01-03

## 2021-02-16 ENCOUNTER — OFFICE VISIT (OUTPATIENT)
Dept: OBGYN | Facility: CLINIC | Age: 31
End: 2021-02-16
Payer: COMMERCIAL

## 2021-02-16 VITALS
SYSTOLIC BLOOD PRESSURE: 116 MMHG | HEART RATE: 76 BPM | BODY MASS INDEX: 35.29 KG/M2 | TEMPERATURE: 97.8 F | WEIGHT: 199.2 LBS | DIASTOLIC BLOOD PRESSURE: 77 MMHG

## 2021-02-16 DIAGNOSIS — Z01.419 ENCOUNTER FOR GYNECOLOGICAL EXAMINATION WITHOUT ABNORMAL FINDING: Primary | ICD-10-CM

## 2021-02-16 DIAGNOSIS — F43.25 ADJUSTMENT DISORDER WITH MIXED DISTURBANCE OF EMOTIONS AND CONDUCT: ICD-10-CM

## 2021-02-16 DIAGNOSIS — E66.09 CLASS 2 OBESITY DUE TO EXCESS CALORIES WITHOUT SERIOUS COMORBIDITY WITH BODY MASS INDEX (BMI) OF 35.0 TO 35.9 IN ADULT: ICD-10-CM

## 2021-02-16 DIAGNOSIS — E66.812 CLASS 2 OBESITY DUE TO EXCESS CALORIES WITHOUT SERIOUS COMORBIDITY WITH BODY MASS INDEX (BMI) OF 35.0 TO 35.9 IN ADULT: ICD-10-CM

## 2021-02-16 DIAGNOSIS — Z12.4 SCREENING FOR MALIGNANT NEOPLASM OF CERVIX: ICD-10-CM

## 2021-02-16 PROCEDURE — G0145 SCR C/V CYTO,THINLAYER,RESCR: HCPCS | Performed by: OBSTETRICS & GYNECOLOGY

## 2021-02-16 PROCEDURE — 87624 HPV HI-RISK TYP POOLED RSLT: CPT | Performed by: OBSTETRICS & GYNECOLOGY

## 2021-02-16 PROCEDURE — 99395 PREV VISIT EST AGE 18-39: CPT | Performed by: OBSTETRICS & GYNECOLOGY

## 2021-02-16 NOTE — PROGRESS NOTES
Svetlana is a 30 year old  female who presents for annual exam.     Menses are regular q 28-30 days and normal lasting 4 days.  Menses flow: normal and light.  Patient's last menstrual period was 2021.. Using oral contraceptives for contraception.  She is not currently considering pregnancy.  Besides routine health maintenance, she has no other health concerns today . COVID pandemic has been boring and lonely for her, but she feels she's doing ok. Work is stressful. Gets anxious when thinking about going back to work.   Heart rates goes up when she's stressed.   Would like to stop OCPs since not sexually active.         PHQ-2 Score:     PHQ-2 (  [employer]) 2021 2/15/2021   Q1: Little interest or pleasure in doing things 0 1   Q2: Feeling down, depressed or hopeless 1 1   PHQ-2 Score 1 2   Q1: Little interest or pleasure in doing things - Several days   Q2: Feeling down, depressed or hopeless - Several days   PHQ-2 Score - 2       GYNECOLOGIC HISTORY:  Menarche: 13  Age at first intercourse: 20  Svetlana is not sexually active with 0 male partner(s) and is not currently in monogamous relationship.    History sexually transmitted infections:No STD history  STI testing offered?  Declined  OSCAR exposure: No  History of abnormal Pap smear: NO - age 30- 65 PAP every 3 years recommended  Family history of breast CA: Yes (Please explain): maaunt  Family history of uterine/ovarian CA: No    Family history of colon CA: Yes (Please explain): mgpa    HEALTH MAINTENANCE:  Cholesterol: (No results found for: CHOL History of abnormal lipids: No  Mammo: na . History of abnormal Mammo: Not applicable.  Regular Self Breast Exams: No  Calcium/Vitamin D intake: source:  dietary supplement(s) Adequate? No  TSH: (No results found for: TSH )  Pap; (No results found for: PAP )    HISTORY:  OB History    Para Term  AB Living   0 0 0 0 0 0   SAB TAB Ectopic Multiple Live Births   0 0 0 0 0     Past Medical  History:   Diagnosis Date     NO ACTIVE PROBLEMS      Past Surgical History:   Procedure Laterality Date     NO HISTORY OF SURGERY       Family History   Problem Relation Age of Onset     Breast Cancer Maternal Aunt      Colon Cancer Maternal Grandfather      Cancer Paternal Grandfather         unknown     Ovarian Cancer No family hx of      Uterine Cancer No family hx of      Social History     Socioeconomic History     Marital status: Single     Spouse name: None     Number of children: None     Years of education: None     Highest education level: None   Occupational History     None   Social Needs     Financial resource strain: None     Food insecurity     Worry: None     Inability: None     Transportation needs     Medical: None     Non-medical: None   Tobacco Use     Smoking status: Never Smoker     Smokeless tobacco: Never Used   Substance and Sexual Activity     Alcohol use: Yes     Comment: occ.     Drug use: No     Sexual activity: Not Currently     Birth control/protection: Pill   Lifestyle     Physical activity     Days per week: None     Minutes per session: None     Stress: None   Relationships     Social connections     Talks on phone: None     Gets together: None     Attends Religion service: None     Active member of club or organization: None     Attends meetings of clubs or organizations: None     Relationship status: None     Intimate partner violence     Fear of current or ex partner: None     Emotionally abused: None     Physically abused: None     Forced sexual activity: None   Other Topics Concern     Parent/sibling w/ CABG, MI or angioplasty before 65F 55M? Not Asked   Social History Narrative     None     No current outpatient medications on file.     Allergies   Allergen Reactions     Seasonal Allergies        Past medical, surgical, social and family history were reviewed and updated in EPIC.    ROS:   C:     NEGATIVE for fever, chills, change in weight  I:       NEGATIVE for worrisome  rashes, moles or lesions  E:     NEGATIVE for vision changes or irritation  E/M: NEGATIVE for ear, mouth and throat problems  R:     NEGATIVE for significant cough or SOB  CV:   NEGATIVE for chest pain, palpitations or peripheral edema  GI:     NEGATIVE for nausea, abdominal pain, heartburn, or change in bowel habits  :   NEGATIVE for frequency, dysuria, hematuria, vaginal discharge, or irregular bleeding  M:     NEGATIVE for significant arthralgias or myalgia  N:      NEGATIVE for weakness, dizziness or paresthesias  E:      NEGATIVE for temperature intolerance, skin/hair changes  P:      NEGATIVE for changes in mood or affect.    EXAM:  BP (!) 137/93   Pulse 78   Temp 97.8  F (36.6  C) (Oral)   Wt 90.4 kg (199 lb 3.2 oz)   LMP 01/25/2021   Breastfeeding No   BMI 35.29 kg/m     BMI: Body mass index is 35.29 kg/m .  Constitutional: healthy, alert and no distress  Head: Normocephalic. No masses, lesions, tenderness or abnormalities  Neck: Neck supple. Trachea midline. No adenopathy. Thyroid symmetric, normal size.   Cardiovascular: RRR.   Respiratory: Negative.   Breast: Breasts reveal mild symmetric fibrocystic densities, but there are no dominant, discrete, fixed or suspicious masses found.  Gastrointestinal: Abdomen soft, non-tender, non-distended. No masses, organomegaly.  :  Vulva:  No external lesions, normal female hair distribution, no inguinal adenopathy.    Urethra:  Midline, non-tender, well supported, no discharge  Vagina:  Moist, pink, no abnormal discharge, no lesions  Uterus:  Normal size, anteverted , non-tender, freely mobile  Ovaries:  No masses appreciated, non-tender, mobile  Rectal Exam: deferred  Musculoskeletal: extremities normal  Skin: no suspicious lesions or rashes  Psychiatric: Affect appropriate, cooperative,mentation appears normal.     COUNSELING:   Reviewed preventive health counseling, as reflected in patient instructions       Regular exercise       Healthy diet/nutrition    reports that she has never smoked. She has never used smokeless tobacco.    Body mass index is 35.29 kg/m .  Weight management plan: Discussed healthy diet and exercise guidelines  FRAX Risk Assessment    ASSESSMENT:  30 year old female with satisfactory annual exam  (Z01.419) Encounter for gynecological examination without abnormal finding  (primary encounter diagnosis)  Comment:   Plan: Pap today    (F43.25) Adjustment disorder with mixed disturbance of emotions and conduct  Comment:   Plan: Discussed options available. Mild symptoms currently. Will start with meditation carlos alberto and work resources, will reach out if she needs therapy resources    (E66.09,  Z68.35) Class 2 obesity due to excess calories without serious comorbidity with body mass index (BMI) of 35.0 to 35.9 in adult  Comment:   Plan: Nine pounds weight gain in last 2 years. Discussed diet and exercise. High risk condition for COVID 19. Recommend working from home until vaccinated if possible.     (Z12.4) Screening for malignant neoplasm of cervix  Comment:   Plan: Pap imaged thin layer screen with HPV -         recommended age 30 - 65 years (select HPV order        below), HPV High Risk Types DNA Cervical

## 2021-02-18 LAB
COPATH REPORT: NORMAL
PAP: NORMAL

## 2021-02-19 LAB
FINAL DIAGNOSIS: NORMAL
HPV HR 12 DNA CVX QL NAA+PROBE: NEGATIVE
HPV16 DNA SPEC QL NAA+PROBE: NEGATIVE
HPV18 DNA SPEC QL NAA+PROBE: NEGATIVE
SPECIMEN DESCRIPTION: NORMAL
SPECIMEN SOURCE CVX/VAG CYTO: NORMAL

## 2021-10-10 ENCOUNTER — HEALTH MAINTENANCE LETTER (OUTPATIENT)
Age: 31
End: 2021-10-10

## 2022-03-26 ENCOUNTER — HEALTH MAINTENANCE LETTER (OUTPATIENT)
Age: 32
End: 2022-03-26

## 2022-05-09 ASSESSMENT — PATIENT HEALTH QUESTIONNAIRE - PHQ9
SUM OF ALL RESPONSES TO PHQ QUESTIONS 1-9: 12
SUM OF ALL RESPONSES TO PHQ QUESTIONS 1-9: 12
10. IF YOU CHECKED OFF ANY PROBLEMS, HOW DIFFICULT HAVE THESE PROBLEMS MADE IT FOR YOU TO DO YOUR WORK, TAKE CARE OF THINGS AT HOME, OR GET ALONG WITH OTHER PEOPLE: SOMEWHAT DIFFICULT

## 2022-05-10 ASSESSMENT — PATIENT HEALTH QUESTIONNAIRE - PHQ9: SUM OF ALL RESPONSES TO PHQ QUESTIONS 1-9: 12

## 2022-05-12 ENCOUNTER — OFFICE VISIT (OUTPATIENT)
Dept: OBGYN | Facility: CLINIC | Age: 32
End: 2022-05-12
Payer: COMMERCIAL

## 2022-05-12 VITALS
WEIGHT: 198 LBS | HEART RATE: 73 BPM | DIASTOLIC BLOOD PRESSURE: 77 MMHG | SYSTOLIC BLOOD PRESSURE: 128 MMHG | BODY MASS INDEX: 35.08 KG/M2 | HEIGHT: 63 IN

## 2022-05-12 DIAGNOSIS — Z30.41 ORAL CONTRACEPTIVE PILL SURVEILLANCE: ICD-10-CM

## 2022-05-12 DIAGNOSIS — Z11.3 SCREEN FOR STD (SEXUALLY TRANSMITTED DISEASE): ICD-10-CM

## 2022-05-12 DIAGNOSIS — Z01.419 ENCOUNTER FOR GYNECOLOGICAL EXAMINATION WITHOUT ABNORMAL FINDING: Primary | ICD-10-CM

## 2022-05-12 PROCEDURE — 87591 N.GONORRHOEAE DNA AMP PROB: CPT | Performed by: OBSTETRICS & GYNECOLOGY

## 2022-05-12 PROCEDURE — 99395 PREV VISIT EST AGE 18-39: CPT | Performed by: OBSTETRICS & GYNECOLOGY

## 2022-05-12 PROCEDURE — 87491 CHLMYD TRACH DNA AMP PROBE: CPT | Performed by: OBSTETRICS & GYNECOLOGY

## 2022-05-12 RX ORDER — NORGESTIMATE AND ETHINYL ESTRADIOL 7DAYSX3 28
KIT ORAL
Qty: 84 TABLET | Refills: 3 | Status: SHIPPED | OUTPATIENT
Start: 2022-05-12 | End: 2023-04-03

## 2022-05-12 RX ORDER — NORGESTIMATE AND ETHINYL ESTRADIOL 7DAYSX3 28
KIT ORAL
COMMUNITY
End: 2022-05-12

## 2022-05-12 NOTE — PROGRESS NOTES
Svetlana is a 31 year old  female who presents for annual exam.     Menses are regular q 28-30 days and normal lasting 4 days.  Menses flow: normal and light.  Patient's last menstrual period was 2022.. Using oral contraceptives for contraception.  She is not currently considering pregnancy.  Besides routine health maintenance, she has no other health concerns    PHQ 2019   PHQ-9 Total Score 4 12   Q9: Thoughts of better off dead/self-harm past 2 weeks Not at all Not at all     MARITZA-7 SCORE 2019   Total Score 5           GYNECOLOGIC HISTORY:  Menarche: 13  Age at first intercourse: 20 Number of lifetime partners: 5  Svetlana is sexually active with 1male partner(s) and is currently in monogamous relationship .    History sexually transmitted infections:No STD history  STI testing offered?  Declined  OSCAR exposure: No  History of abnormal Pap smear: NO - age 30- 65 PAP every 3 years recommended  Family history of breast CA: yes- m- aunt  Family history of uterine/ovarian CA: No    Family history of colon CA: Yes (Please explain): [facility]    HEALTH MAINTENANCE:  Cholesterol: (No results found for: CHOL History of abnormal lipids: No  Mammo: no . History of abnormal Mammo: No.  Regular Self Breast Exams: No  Calcium/Vitamin D intake: source:  dairy, dietary supplement(s) Adequate? Yes  TSH: (No results found for: TSH )  Pap; (  Lab Results   Component Value Date    PAP NIL 2021    )    HISTORY:  OB History    Para Term  AB Living   0 0 0 0 0 0   SAB IAB Ectopic Multiple Live Births   0 0 0 0 0     Past Medical History:   Diagnosis Date     NO ACTIVE PROBLEMS      Past Surgical History:   Procedure Laterality Date     NO HISTORY OF SURGERY       Family History   Problem Relation Age of Onset     Colon Cancer Maternal Grandfather         80s     Cancer Paternal Grandfather         unknown     Breast Cancer Maternal Aunt         postmenopausal     Ovarian Cancer No family hx of   "    Uterine Cancer No family hx of      Social History     Socioeconomic History     Marital status: Single   Tobacco Use     Smoking status: Never Smoker     Smokeless tobacco: Never Used   Substance and Sexual Activity     Alcohol use: Yes     Comment: occ.     Drug use: No     Sexual activity: Not Currently     Birth control/protection: Pill       Current Outpatient Medications:      norgestim-eth estrad triphasic (TRINESSA, 28,) 0.18/0.215/0.25 MG-35 MCG tablet, Take one tablet daily, Disp: 84 tablet, Rfl: 3     Allergies   Allergen Reactions     Seasonal Allergies        Past medical, surgical, social and family history were reviewed and updated in EPIC.    ROS:   C:     NEGATIVE for fever, chills, change in weight  I:       NEGATIVE for worrisome rashes, moles or lesions  E:     NEGATIVE for vision changes or irritation  E/M: NEGATIVE for ear, mouth and throat problems  R:     NEGATIVE for significant cough or SOB  CV:   NEGATIVE for chest pain, palpitations or peripheral edema  GI:     NEGATIVE for nausea, abdominal pain, heartburn, or change in bowel habits  :   NEGATIVE for frequency, dysuria, hematuria, vaginal discharge, or irregular bleeding  M:     NEGATIVE for significant arthralgias or myalgia  N:      NEGATIVE for weakness, dizziness or paresthesias  E:      NEGATIVE for temperature intolerance, skin/hair changes  P:      NEGATIVE for changes in mood or affect.    EXAM:  /77   Pulse 73   Ht 1.6 m (5' 3\")   Wt 89.8 kg (198 lb)   LMP 05/05/2022   Breastfeeding No   BMI 35.07 kg/m     BMI: Body mass index is 35.07 kg/m .  Constitutional: healthy, alert and no distress  Head: Normocephalic. No masses, lesions, tenderness or abnormalities  Neck: Neck supple. Trachea midline. No adenopathy. Thyroid symmetric, normal size.   Cardiovascular: RRR.   Respiratory: Negative.   Breast: Breasts reveal mild symmetric fibrocystic densities, but there are no dominant, discrete, fixed or suspicious " masses found.  Gastrointestinal: Abdomen soft, non-tender, non-distended. No masses, organomegaly.  :  Vulva:  No external lesions, normal female hair distribution, no inguinal adenopathy.    Urethra:  Midline, non-tender, well supported, no discharge  Vagina:  Moist, pink, no abnormal discharge, no lesions  Rectal Exam: deferred  Musculoskeletal: extremities normal  Skin: no suspicious lesions or rashes  Psychiatric: Affect appropriate, cooperative,mentation appears normal.     COUNSELING:   Reviewed preventive health counseling, as reflected in patient instructions       Regular exercise       Healthy diet/nutrition       Contraception       Safe sex practices/STD prevention   reports that she has never smoked. She has never used smokeless tobacco.    Body mass index is 35.07 kg/m .  Weight management plan: Discussed healthy diet and exercise guidelines  FRAX Risk Assessment    ASSESSMENT:  31 year old female with satisfactory annual exam  (Z01.419) Encounter for gynecological examination without abnormal finding  (primary encounter diagnosis)  Comment:   Plan: UTD on   Discussed PHQ 9 - going through a stressful work time and trying to make some decisions.   Declines referral at this time, but will check in with her in 2 weeks ( mutually agreed time frame) by Elmer.     (Z30.41) Oral contraceptive pill surveillance  Comment:   Plan: norgestim-eth estrad triphasic (TRINESSA, 28,)         0.18/0.215/0.25 MG-35 MCG tablet            (Z11.3) Screen for STD (sexually transmitted disease)  Comment:   Plan: NEISSERIA GONORRHOEA PCR, CHLAMYDIA TRACHOMATIS        PCR            Answers for HPI/ROS submitted by the patient on 5/9/2022  If you checked off any problems, how difficult have these problems made it for you to do your work, take care of things at home, or get along with other people?: Somewhat difficult  PHQ9 TOTAL SCORE: 12      Depression Screening Follow Up    PHQ 5/9/2022   PHQ-9 Total Score 12   Q9:  Thoughts of better off dead/self-harm past 2 weeks Not at all     Last PHQ-9 5/9/2022   1.  Little interest or pleasure in doing things 1   2.  Feeling down, depressed, or hopeless 2   3.  Trouble falling or staying asleep, or sleeping too much 2   4.  Feeling tired or having little energy 2   5.  Poor appetite or overeating 2   6.  Feeling bad about yourself 2   7.  Trouble concentrating 1   8.  Moving slowly or restless 0   Q9: Thoughts of better off dead/self-harm past 2 weeks 0   PHQ-9 Total Score 12   Difficulty at work, home, or with people -       Follow Up Actions Taken  Patient declined referral.  Follow up recommended: 2 weeks Elmer f/u

## 2022-05-13 LAB
C TRACH DNA SPEC QL NAA+PROBE: NEGATIVE
N GONORRHOEA DNA SPEC QL NAA+PROBE: NEGATIVE

## 2022-09-18 ENCOUNTER — HEALTH MAINTENANCE LETTER (OUTPATIENT)
Age: 32
End: 2022-09-18

## 2023-04-03 ENCOUNTER — MYC MEDICAL ADVICE (OUTPATIENT)
Dept: OBGYN | Facility: CLINIC | Age: 33
End: 2023-04-03
Payer: COMMERCIAL

## 2023-04-03 DIAGNOSIS — Z30.41 ORAL CONTRACEPTIVE PILL SURVEILLANCE: ICD-10-CM

## 2023-04-03 RX ORDER — NORGESTIMATE AND ETHINYL ESTRADIOL 7DAYSX3 28
KIT ORAL
Qty: 84 TABLET | Refills: 0 | Status: SHIPPED | OUTPATIENT
Start: 2023-04-03

## 2023-04-03 NOTE — CONFIDENTIAL NOTE
Patient looking for primary care due to her moving. Will send additional three month refill.    Cassy Roy RN

## 2023-07-30 ENCOUNTER — HEALTH MAINTENANCE LETTER (OUTPATIENT)
Age: 33
End: 2023-07-30

## 2024-09-22 ENCOUNTER — HEALTH MAINTENANCE LETTER (OUTPATIENT)
Age: 34
End: 2024-09-22